# Patient Record
Sex: MALE | Race: WHITE | NOT HISPANIC OR LATINO
[De-identification: names, ages, dates, MRNs, and addresses within clinical notes are randomized per-mention and may not be internally consistent; named-entity substitution may affect disease eponyms.]

---

## 2019-03-27 PROBLEM — Z00.00 ENCOUNTER FOR PREVENTIVE HEALTH EXAMINATION: Status: ACTIVE | Noted: 2019-03-27

## 2019-04-26 ENCOUNTER — APPOINTMENT (OUTPATIENT)
Dept: PULMONOLOGY | Facility: CLINIC | Age: 64
End: 2019-04-26
Payer: COMMERCIAL

## 2019-04-26 VITALS
WEIGHT: 155 LBS | DIASTOLIC BLOOD PRESSURE: 60 MMHG | SYSTOLIC BLOOD PRESSURE: 90 MMHG | HEIGHT: 70.47 IN | BODY MASS INDEX: 21.94 KG/M2

## 2019-04-26 VITALS — HEART RATE: 95 BPM | OXYGEN SATURATION: 95 %

## 2019-04-26 DIAGNOSIS — Z82.49 FAMILY HISTORY OF ISCHEMIC HEART DISEASE AND OTHER DISEASES OF THE CIRCULATORY SYSTEM: ICD-10-CM

## 2019-04-26 DIAGNOSIS — F17.200 NICOTINE DEPENDENCE, UNSPECIFIED, UNCOMPLICATED: ICD-10-CM

## 2019-04-26 DIAGNOSIS — Z87.898 PERSONAL HISTORY OF OTHER SPECIFIED CONDITIONS: ICD-10-CM

## 2019-04-26 DIAGNOSIS — Z78.9 OTHER SPECIFIED HEALTH STATUS: ICD-10-CM

## 2019-04-26 DIAGNOSIS — Z86.79 PERSONAL HISTORY OF OTHER DISEASES OF THE CIRCULATORY SYSTEM: ICD-10-CM

## 2019-04-26 DIAGNOSIS — R09.89 OTHER SPECIFIED SYMPTOMS AND SIGNS INVOLVING THE CIRCULATORY AND RESPIRATORY SYSTEMS: ICD-10-CM

## 2019-04-26 DIAGNOSIS — Z83.52 FAMILY HISTORY OF EAR DISORDERS: ICD-10-CM

## 2019-04-26 PROCEDURE — 99406 BEHAV CHNG SMOKING 3-10 MIN: CPT

## 2019-04-26 PROCEDURE — 94727 GAS DIL/WSHOT DETER LNG VOL: CPT

## 2019-04-26 PROCEDURE — 94664 DEMO&/EVAL PT USE INHALER: CPT | Mod: 59

## 2019-04-26 PROCEDURE — 85018 HEMOGLOBIN: CPT | Mod: QW

## 2019-04-26 PROCEDURE — 94060 EVALUATION OF WHEEZING: CPT

## 2019-04-26 PROCEDURE — 99205 OFFICE O/P NEW HI 60 MIN: CPT | Mod: 25

## 2019-04-26 PROCEDURE — 94729 DIFFUSING CAPACITY: CPT

## 2019-04-26 PROCEDURE — G0296 VISIT TO DETERM LDCT ELIG: CPT

## 2019-04-26 RX ORDER — ALBUTEROL SULFATE 90 UG/1
108 (90 BASE) AEROSOL, METERED RESPIRATORY (INHALATION)
Qty: 1 | Refills: 3 | Status: ACTIVE | COMMUNITY
Start: 2019-04-26 | End: 1900-01-01

## 2019-04-26 RX ORDER — AMLODIPINE BESYLATE 5 MG/1
5 TABLET ORAL
Refills: 0 | Status: ACTIVE | COMMUNITY

## 2019-04-26 NOTE — HISTORY OF PRESENT ILLNESS
[Excessive Daytime Sleepiness] : excessive daytime sleepiness [Witnessed Gasping During Sleep] : witnessed gasping during sleep [Witnessed Apnea During Sleep] : witnessed apnea during sleep [Snoring] : snoring [Unrefreshing Sleep] : unrefreshing sleep [Sleepy When Sedentary] : sleepy when sedentary [FreeTextEntry1] : Patient c/o SOBOE but is otherwise without associated respiratory complaints other than an occasional cough though does reports PNDS.\par Current smoker of 1+ppd x 32 years (1987). I spent > 3 min discussing the risks of smoking and the benefits and therapy for smoking cessation including nicotine replacement, medications, and programs.\par  [Initial Evaluation] : an initial evaluation of [Dyspnea on Exertion] : dyspnea on exertion [Non-Productive Cough] : non-productive cough [Currently Experiencing] : The patient is currently experiencing symptoms. [On ___] : performed on [unfilled] [Patient] : the patient [None] : no new symptoms reported [Indication ___] : for an indication of [unfilled] [FreeTextEntry8] : Emphysematous changes with multiple small nodules [FreeTextEntry9] : Chest CT

## 2019-04-26 NOTE — DISCUSSION/SUMMARY
[FreeTextEntry1] : \par #1. Smoking cessation stressed; Referred patient to the [facility] smoking cessation program.\par #2. PSG to evaluate for possible MIGEL\par #3. Repeat chest CT to f/u previously seen nodules given smoking hx. Chest CT for lung cancer screening given smoking hx. Risks and benefits regarding screening CT discussed including but not limited to the benefit of early lung cancer detection as well as other possible unknown pathology but also risk of discovering nodules or other findings which may be benign but will require periodic evaluation.\par #4. Mild COPD seen on PFTs; trial of Incruse\par #5. ProAir as needed\par #6. Flonase for PNDS\par #7. F/u after PSG to evaluate response to Incruse and review chest CT

## 2019-04-26 NOTE — PROCEDURE
[FreeTextEntry1] : PFTs 4/26/19 - Normal FVC and mildly reduced FEV1 with an obstructive pattern but no significant BD response. Lung volumes were essentially normal with mild hyperinflation and gas trapping and mildly reduced diffusion capacity

## 2019-04-26 NOTE — REASON FOR VISIT
[Initial Evaluation] : an initial evaluation [Shortness of Breath] : shortness of Breath [Abnormal CT Scan] : abnormal CT Scan [Cough] : cough [FreeTextEntry2] : nicotine dependence, nodules

## 2019-04-26 NOTE — REVIEW OF SYSTEMS
[Nasal Congestion] : nasal congestion [Postnasal Drip] : postnasal drip [Sinus Problems] : sinus problems [Cough] : cough [Hypertension] : ~T hypertension [Depression] : depression [As Noted in HPI] : as noted in HPI [Fever] : no fever [Chills] : no chills [Dry Eyes] : no dryness of the eyes [Epistaxis] : no nosebleeds [Eye Irritation] : no ~T irritation of the eyes [Chest Tightness] : no chest tightness [Dyspnea] : no dyspnea [Sputum] : not coughing up ~M sputum [Chest Discomfort] : no chest discomfort [Pleuritic Pain] : no pleuritic pain [Wheezing] : no wheezing [Dysrhythmia] : no dysrhythmia [Murmurs] : no murmurs were heard [Edema] : ~T edema was not present [Hay Fever] : no hay fever [Palpitations] : no palpitations [Nausea] : no nausea [Reflux] : no reflux [Itchy Eyes] : no itching of ~T the eyes [Vomiting] : no vomiting [Constipation] : no constipation [Diarrhea] : no diarrhea [Abdominal Pain] : no abdominal pain [Dysuria] : no dysuria [Trauma] : no ~T physical trauma [Headache] : no headache [Fracture] : no fracture [Anemia] : no anemia [Syncope] : no fainting [Paralysis] : no paralysis was seen [Numbness] : no numbness [Seizures] : no seizures [Diabetes] : no diabetes mellitus [Thyroid Problem] : no thyroid problem [Anxiety] : no anxiety

## 2019-04-26 NOTE — PHYSICAL EXAM
[General Appearance - In No Acute Distress] : no acute distress [Normal Appearance] : normal appearance [General Appearance - Well Developed] : well developed [Low Lying Soft Palate] : low lying soft palate [Normal Conjunctiva] : the conjunctiva exhibited no abnormalities [Enlarged Base of the Tongue] : enlargement of the base of the tongue [II] : II [Neck Appearance] : the appearance of the neck was normal [Heart Rate And Rhythm] : heart rate and rhythm were normal [Murmurs] : no murmurs present [Heart Sounds] : normal S1 and S2 [Edema] : no peripheral edema present [] : no respiratory distress [Respiration, Rhythm And Depth] : normal respiratory rhythm and effort [Exaggerated Use Of Accessory Muscles For Inspiration] : no accessory muscle use [Auscultation Breath Sounds / Voice Sounds] : lungs were clear to auscultation bilaterally [Abdomen Tenderness] : non-tender [Abdomen Soft] : soft [Abnormal Walk] : normal gait [Cyanosis, Localized] : no localized cyanosis [Nail Clubbing] : no clubbing of the fingernails [No Focal Deficits] : no focal deficits [Oriented To Time, Place, And Person] : oriented to person, place, and time [Elongated Uvula] : no elongated uvula [FreeTextEntry1] : No abnormalities.

## 2019-04-26 NOTE — CONSULT LETTER
[Dear  ___] : Dear  [unfilled], [Consult Letter:] : I had the pleasure of evaluating your patient, [unfilled]. [Please see my note below.] : Please see my note below. [Sincerely,] : Sincerely, [Consult Closing:] : Thank you very much for allowing me to participate in the care of this patient.  If you have any questions, please do not hesitate to contact me. [FreeTextEntry3] : Narendra Dorado MD, FCCP, CORRIE. ABSM\par

## 2019-04-29 RX ORDER — UMECLIDINIUM 62.5 UG/1
62.5 AEROSOL, POWDER ORAL DAILY
Qty: 1 | Refills: 5 | Status: DISCONTINUED | COMMUNITY
Start: 2019-04-26 | End: 2019-04-29

## 2019-04-29 RX ORDER — TIOTROPIUM BROMIDE 18 UG/1
18 CAPSULE ORAL; RESPIRATORY (INHALATION) DAILY
Qty: 1 | Refills: 2 | Status: ACTIVE | COMMUNITY
Start: 2019-04-29 | End: 1900-01-01

## 2019-05-17 ENCOUNTER — FORM ENCOUNTER (OUTPATIENT)
Age: 64
End: 2019-05-17

## 2019-05-18 ENCOUNTER — OUTPATIENT (OUTPATIENT)
Dept: OUTPATIENT SERVICES | Facility: HOSPITAL | Age: 64
LOS: 1 days | End: 2019-05-18
Payer: COMMERCIAL

## 2019-05-18 ENCOUNTER — APPOINTMENT (OUTPATIENT)
Dept: CT IMAGING | Facility: CLINIC | Age: 64
End: 2019-05-18
Payer: COMMERCIAL

## 2019-05-18 DIAGNOSIS — Z87.891 PERSONAL HISTORY OF NICOTINE DEPENDENCE: ICD-10-CM

## 2019-05-18 PROCEDURE — 71250 CT THORAX DX C-: CPT | Mod: 26

## 2019-05-18 PROCEDURE — 71250 CT THORAX DX C-: CPT

## 2019-07-15 ENCOUNTER — OUTPATIENT (OUTPATIENT)
Dept: OUTPATIENT SERVICES | Facility: HOSPITAL | Age: 64
LOS: 1 days | End: 2019-07-15
Payer: COMMERCIAL

## 2019-07-15 DIAGNOSIS — G47.33 OBSTRUCTIVE SLEEP APNEA (ADULT) (PEDIATRIC): ICD-10-CM

## 2019-07-15 PROCEDURE — 95810 POLYSOM 6/> YRS 4/> PARAM: CPT | Mod: 26

## 2019-07-15 PROCEDURE — 95810 POLYSOM 6/> YRS 4/> PARAM: CPT

## 2019-09-06 ENCOUNTER — APPOINTMENT (OUTPATIENT)
Dept: PULMONOLOGY | Facility: CLINIC | Age: 64
End: 2019-09-06
Payer: COMMERCIAL

## 2019-09-06 ENCOUNTER — MED ADMIN CHARGE (OUTPATIENT)
Age: 64
End: 2019-09-06

## 2019-09-06 VITALS
OXYGEN SATURATION: 96 % | SYSTOLIC BLOOD PRESSURE: 134 MMHG | DIASTOLIC BLOOD PRESSURE: 86 MMHG | HEIGHT: 70 IN | WEIGHT: 155 LBS | HEART RATE: 81 BPM | BODY MASS INDEX: 22.19 KG/M2

## 2019-09-06 DIAGNOSIS — Z87.891 PERSONAL HISTORY OF NICOTINE DEPENDENCE: ICD-10-CM

## 2019-09-06 DIAGNOSIS — J44.9 CHRONIC OBSTRUCTIVE PULMONARY DISEASE, UNSPECIFIED: ICD-10-CM

## 2019-09-06 DIAGNOSIS — Z86.69 PERSONAL HISTORY OF OTHER DISEASES OF THE NERVOUS SYSTEM AND SENSE ORGANS: ICD-10-CM

## 2019-09-06 DIAGNOSIS — R06.02 SHORTNESS OF BREATH: ICD-10-CM

## 2019-09-06 DIAGNOSIS — R09.82 POSTNASAL DRIP: ICD-10-CM

## 2019-09-06 DIAGNOSIS — R05 COUGH: ICD-10-CM

## 2019-09-06 DIAGNOSIS — R91.8 OTHER NONSPECIFIC ABNORMAL FINDING OF LUNG FIELD: ICD-10-CM

## 2019-09-06 DIAGNOSIS — R93.89 ABNORMAL FINDINGS ON DIAGNOSTIC IMAGING OF OTHER SPECIFIED BODY STRUCTURES: ICD-10-CM

## 2019-09-06 DIAGNOSIS — J43.9 EMPHYSEMA, UNSPECIFIED: ICD-10-CM

## 2019-09-06 PROCEDURE — 94010 BREATHING CAPACITY TEST: CPT

## 2019-09-06 PROCEDURE — G0009: CPT

## 2019-09-06 PROCEDURE — 99215 OFFICE O/P EST HI 40 MIN: CPT | Mod: 25

## 2019-09-06 PROCEDURE — 99406 BEHAV CHNG SMOKING 3-10 MIN: CPT | Mod: 25

## 2019-09-06 PROCEDURE — 90670 PCV13 VACCINE IM: CPT

## 2019-09-06 RX ORDER — CHLORTHALIDONE 25 MG/1
25 TABLET ORAL
Refills: 0 | Status: DISCONTINUED | COMMUNITY
End: 2019-09-06

## 2019-09-06 RX ORDER — CANDESARTAN CILEXETIL 4 MG/1
TABLET ORAL
Refills: 0 | Status: DISCONTINUED | COMMUNITY
End: 2019-09-06

## 2019-09-06 NOTE — CONSULT LETTER
[Dear  ___] : Dear  [unfilled], [Consult Letter:] : I had the pleasure of evaluating your patient, [unfilled]. [Please see my note below.] : Please see my note below. [Consult Closing:] : Thank you very much for allowing me to participate in the care of this patient.  If you have any questions, please do not hesitate to contact me. [Sincerely,] : Sincerely, [FreeTextEntry3] : Narendra Dorado MD, FCCP, D. ABSM\par Pulmonary and Sleep Medicine\par [facility] [place]\par \par

## 2019-09-06 NOTE — PHYSICAL EXAM
[General Appearance - Well Developed] : well developed [Normal Appearance] : normal appearance [General Appearance - In No Acute Distress] : no acute distress [Normal Conjunctiva] : the conjunctiva exhibited no abnormalities [Low Lying Soft Palate] : low lying soft palate [Enlarged Base of the Tongue] : enlargement of the base of the tongue [II] : II [Neck Appearance] : the appearance of the neck was normal [Heart Sounds] : normal S1 and S2 [Heart Rate And Rhythm] : heart rate and rhythm were normal [] : no respiratory distress [Edema] : no peripheral edema present [Murmurs] : no murmurs present [Respiration, Rhythm And Depth] : normal respiratory rhythm and effort [Exaggerated Use Of Accessory Muscles For Inspiration] : no accessory muscle use [Auscultation Breath Sounds / Voice Sounds] : lungs were clear to auscultation bilaterally [Abdomen Tenderness] : non-tender [Abdomen Soft] : soft [Nail Clubbing] : no clubbing of the fingernails [Abnormal Walk] : normal gait [Cyanosis, Localized] : no localized cyanosis [No Focal Deficits] : no focal deficits [Oriented To Time, Place, And Person] : oriented to person, place, and time [Elongated Uvula] : no elongated uvula [FreeTextEntry1] : No abnormalities.

## 2019-09-06 NOTE — REASON FOR VISIT
[Abnormal CT Scan] : abnormal CT Scan [Follow-Up] : a follow-up visit [Shortness of Breath] : shortness of Breath [Cough] : cough [FreeTextEntry2] : nicotine dependence, nodules

## 2019-09-06 NOTE — HISTORY OF PRESENT ILLNESS
[Non-Productive Cough] : non-productive cough [Dyspnea on Exertion] : dyspnea on exertion [Follow-Up - Routine Clinic] : a routine clinic follow-up of [Excessive Daytime Sleepiness] : excessive daytime sleepiness [Unrefreshing Sleep] : unrefreshing sleep [Snoring] : snoring [Sleepy When Sedentary] : sleepy when sedentary [Currently Experiencing] : The patient is currently experiencing symptoms. [On ___] : performed on [unfilled] [Patient] : the patient [None] : no new symptoms reported [Indication ___] : for an indication of [unfilled] [FreeTextEntry1] : Patient c/o SOBOE but is otherwise without associated respiratory complaints other than an occasional cough though does reports PNDS.\par Current smoker of 1+ppd x 32 years (1987). I spent > 3 min discussing the risks of smoking and the benefits and therapy for smoking cessation including nicotine replacement, medications, and programs. He reports he had quit smoking x 46 days but now is back to smoking <1 ppd.\par  [Witnessed Apnea During Sleep] : no witnessed apnea during sleep [Witnessed Gasping During Sleep] : no witnessed gasping during sleep [FreeTextEntry9] : Chest CT [FreeTextEntry8] : Emphysematous changes with multiple small nodules

## 2019-09-06 NOTE — REVIEW OF SYSTEMS
[Nasal Congestion] : nasal congestion [Postnasal Drip] : postnasal drip [Sinus Problems] : sinus problems [Cough] : cough [Hypertension] : ~T hypertension [Depression] : depression [As Noted in HPI] : as noted in HPI [Fever] : no fever [Chills] : no chills [Dry Eyes] : no dryness of the eyes [Eye Irritation] : no ~T irritation of the eyes [Sputum] : not coughing up ~M sputum [Epistaxis] : no nosebleeds [Dyspnea] : no dyspnea [Chest Tightness] : no chest tightness [Wheezing] : no wheezing [Pleuritic Pain] : no pleuritic pain [Chest Discomfort] : no chest discomfort [Dysrhythmia] : no dysrhythmia [Murmurs] : no murmurs were heard [Edema] : ~T edema was not present [Palpitations] : no palpitations [Hay Fever] : no hay fever [Itchy Eyes] : no itching of ~T the eyes [Reflux] : no reflux [Nausea] : no nausea [Vomiting] : no vomiting [Constipation] : no constipation [Abdominal Pain] : no abdominal pain [Dysuria] : no dysuria [Diarrhea] : no diarrhea [Fracture] : no fracture [Trauma] : no ~T physical trauma [Anemia] : no anemia [Syncope] : no fainting [Headache] : no headache [Numbness] : no numbness [Paralysis] : no paralysis was seen [Seizures] : no seizures [Anxiety] : no anxiety [Diabetes] : no diabetes mellitus [Thyroid Problem] : no thyroid problem

## 2019-09-06 NOTE — DISCUSSION/SUMMARY
[FreeTextEntry1] : \par #1. Smoking cessation stressed again; he completed the [facility] smoking cessation program with success\par #2. PSG to evaluate for possible MIGEL was negative \par #3. Repeat chest CT to f/u previously seen nodules given smoking hx. Chest CT for lung cancer screening given smoking hx. Risks and benefits regarding screening CT discussed including but not limited to the benefit of early lung cancer detection as well as other possible unknown pathology but also risk of discovering nodules or other findings which may be benign but will require periodic evaluation. Current CT is without nodules but with emphysematous changes; repeat in 5-6/2020 for annual f/u\par #4. Mild COPD seen on PFTs; continue Incruse\par #5. ProAir as needed\par #6. Flonase for PNDS\par #7. F/u in 4 months\par #8. Prevnar given on 9/6/19; will need Pneumovax in one year\par #9. Cardiology evaluation given coronary calcifications seen on CT; card given to pt

## 2019-09-06 NOTE — PROCEDURE
[FreeTextEntry1] : PFTs 4/26/19 - Normal FVC and mildly reduced FEV1 with an obstructive pattern but no significant BD response. Lung volumes were essentially normal with mild hyperinflation and gas trapping and mildly reduced diffusion capacity\par Spirometry 9/6/19 - Normal FVC and mildly reduced FEV1 with an obstructive pattern and slightly improved from previous

## 2020-05-19 ENCOUNTER — APPOINTMENT (OUTPATIENT)
Dept: PULMONOLOGY | Facility: CLINIC | Age: 65
End: 2020-05-19